# Patient Record
Sex: FEMALE | Race: WHITE | Employment: FULL TIME | URBAN - METROPOLITAN AREA
[De-identification: names, ages, dates, MRNs, and addresses within clinical notes are randomized per-mention and may not be internally consistent; named-entity substitution may affect disease eponyms.]

---

## 2018-01-25 ENCOUNTER — HOSPITAL ENCOUNTER (OUTPATIENT)
Dept: MAMMOGRAPHY | Age: 58
Discharge: HOME | End: 2018-01-25
Payer: COMMERCIAL

## 2018-01-25 DIAGNOSIS — Z12.39 SCREENING BREAST EXAMINATION: ICD-10-CM

## 2018-01-25 PROCEDURE — 77067 SCR MAMMO BI INCL CAD: CPT

## 2019-04-09 ENCOUNTER — OFFICE VISIT (OUTPATIENT)
Dept: FAMILY MEDICINE CLINIC | Age: 59
End: 2019-04-09

## 2019-04-09 VITALS
BODY MASS INDEX: 39.34 KG/M2 | RESPIRATION RATE: 18 BRPM | HEIGHT: 64 IN | HEART RATE: 83 BPM | OXYGEN SATURATION: 96 % | DIASTOLIC BLOOD PRESSURE: 87 MMHG | SYSTOLIC BLOOD PRESSURE: 132 MMHG | WEIGHT: 230.4 LBS | TEMPERATURE: 97.8 F

## 2019-04-09 DIAGNOSIS — M19.90 ARTHRITIS: ICD-10-CM

## 2019-04-09 DIAGNOSIS — I10 ESSENTIAL HYPERTENSION: Primary | ICD-10-CM

## 2019-04-09 RX ORDER — LISINOPRIL 10 MG/1
10 TABLET ORAL DAILY
Qty: 90 TAB | Refills: 1 | Status: SHIPPED | OUTPATIENT
Start: 2019-04-09

## 2019-04-09 RX ORDER — HYDROCHLOROTHIAZIDE 25 MG/1
25 TABLET ORAL DAILY
Qty: 90 TAB | Refills: 1 | Status: SHIPPED | OUTPATIENT
Start: 2019-04-09

## 2019-04-09 RX ORDER — CELECOXIB 200 MG/1
200 CAPSULE ORAL DAILY
Qty: 90 CAP | Refills: 1 | Status: SHIPPED | OUTPATIENT
Start: 2019-04-09 | End: 2019-07-08

## 2019-04-09 NOTE — PROGRESS NOTES
Subjective:     Winifred Rucker is a 63 y.o. female who presents for follow up of hypertension. Diet and Lifestyle: generally follows a low fat low cholesterol diet  Home BP Monitoring: is not measured at home    Cardiovascular ROS: taking medications as instructed, no medication side effects noted, no TIA's, no chest pain on exertion, no dyspnea on exertion, no swelling of ankles. New concerns: Pt is here today as a new patient, with needing refills of her medications. She is on BP medications, as well as Celebrex for her arthritis in her knees. Pt is not fasting today, but will get baseline labs. Patient Active Problem List    Diagnosis Date Noted    Leiomyoma of uterus, unspecified     Excessive or frequent menstruation 10/20/2011     Current Outpatient Medications   Medication Sig Dispense Refill    cholecalciferol, vitD3,/vit K2 (VITAMIN D3-VITAMIN K2 PO) Take  by mouth.  celecoxib (CELEBREX) 200 mg capsule Take 1 Cap by mouth daily for 90 days. 90 Cap 1    lisinopril (PRINIVIL, ZESTRIL) 10 mg tablet Take 1 Tab by mouth daily. Indications: high blood pressure 90 Tab 1    hydroCHLOROthiazide (HYDRODIURIL) 25 mg tablet Take 1 Tab by mouth daily. 90 Tab 1    celecoxib (CELEBREX) 200 mg capsule Take 100 mg by mouth two (2) times a day.  ACETAMINOPHEN (TYLENOL PO) Take 1,300 mg by mouth three (3) times daily.  TRAMADOL HCL (TRAMADOL PO) Take  by mouth.  meperidine (DEMEROL) 50 mg tablet 1-2 tabs PO Q 3-6 hrs PRN 60 Tab 0    promethazine (PHENERGAN) 25 mg tablet 1/2 to 1 tab PO Q 4-6 hrs PRN 30 Tab 0    montelukast (SINGULAIR) 10 mg tablet Take 10 mg by mouth daily.  multivitamin, stress formula (STRESS TAB) tablet Take 1 Tab by mouth daily.  omeprazole (PRILOSEC) 10 mg capsule Take 20 mg by mouth daily.        Allergies   Allergen Reactions    Dexilant [Dexlansoprazole] Unknown (comments)    Prevacid [Lansoprazole] Unknown (comments)     Past Medical History: Diagnosis Date    Arthritis     Duodenal ulcer     Excessive or frequent menstruation 10/20/2011    Hx of colonoscopy 2011    WNL    Hypertension     borderline HTN    Kidney stone     Kidney stones     Leiomyoma of uterus, unspecified     Menstrual migraine, without mention of intractable migraine without mention of status migrainosus      Past Surgical History:   Procedure Laterality Date    HC LITHOTRIPSY      HX ARTHROPLASTY  12/11/2013    Right Hand (joint)    HX ARTHROPLASTY  10/2014    left cmc tendon arthroplsy    HX OTHER SURGICAL  10/2014    left thumb 1st MPJ capsulorophy     Family History   Problem Relation Age of Onset    Hypertension Mother     Arthritis-osteo Mother     Heart Disease Father     Other Father         Lymphoma    Cancer Father         Bladder Neoplasm,Malignant/Prostate    Breast Cancer Maternal Aunt     Other Sister         Fibroids/Leiomyoma of Uterus    Depression Sister     Bipolar Disorder Sister     Other Sister         Osteopenia (younger sister states she is 1 point away from osteoporosis)     Social History     Tobacco Use    Smoking status: Never Smoker    Smokeless tobacco: Never Used   Substance Use Topics    Alcohol use: Yes     Comment: 2 glasses of wine a week.         Lab Results   Component Value Date/Time    WBC 9.8 10/11/2011 01:53 AM    HGB 12.5 10/11/2011 01:53 AM    HCT 36.5 10/11/2011 01:53 AM    PLATELET 317 73/11/3101 01:53 AM    MCV 84.5 10/11/2011 01:53 AM     No results found for: CHOL, CHOLPOCT, HDL, LDL, LDLC, LDLCPOC, LDLCEXT, TRIGL, TGLPOCT, CHHD, CHHDX  Lab Results   Component Value Date/Time    GFR est non-AA >60 12/11/2009 07:58 AM    GFR est AA >60 12/11/2009 07:58 AM    Creatinine 0.7 12/11/2009 07:58 AM    BUN 12 12/11/2009 07:58 AM    Sodium 140 12/11/2009 07:58 AM    Potassium 4.1 12/11/2009 07:58 AM    Chloride 103 12/11/2009 07:58 AM    CO2 31 12/11/2009 07:58 AM        Review of Systems, additional:  Pertinent items are noted in HPI. Objective:     Visit Vitals  /87 (BP 1 Location: Right arm, BP Patient Position: Sitting)   Pulse 83   Temp 97.8 °F (36.6 °C) (Oral)   Resp 18   Ht 5' 4\" (1.626 m)   Wt 230 lb 6.4 oz (104.5 kg)   SpO2 96%   BMI 39.55 kg/m²     Appearance: alert, well appearing, and in no distress. General exam: CVS exam BP noted to be well controlled today in office, S1, S2 normal, no gallop, no murmur, chest clear, no JVD, no HSM, no edema. Lab review: orders written for new lab studies as appropriate; see orders. Assessment/Plan:     hypertension stable. current treatment plan is effective, no change in therapy. ICD-10-CM ICD-9-CM    1. Essential hypertension I10 401.9 lisinopril (PRINIVIL, ZESTRIL) 10 mg tablet      hydroCHLOROthiazide (HYDRODIURIL) 25 mg tablet      CBC WITH AUTOMATED DIFF      METABOLIC PANEL, COMPREHENSIVE   2. Arthritis M19.90 716.90 celecoxib (CELEBREX) 200 mg capsule     Educated about taking medications as prescribed. Educated that we will notify her when labs return, and inform her of any change in plan of care at that time. Pt informed to return to office with worsening of symptoms, or PRN with any questions or concerns. Pt verbalizes understanding of plan of care and denies further questions or concerns at this time.

## 2019-04-09 NOTE — PROGRESS NOTES
Dexter Rosita is a 63 y.o. female      Patient states she does not have any medication allergies. Patient states she avoids aspirin due to ear ringing   No chief complaint on file. 1. Have you been to the ER, urgent care clinic since your last visit? Hospitalized since your last visit? No  M  2. Have you seen or consulted any other health care providers outside of the [address] [place] [address] since your last visit? Include any pap smears or colon screening. No      Visit Vitals  Ht 5' 4\" (1.626 m)   Wt 230 lb 6.4 oz (104.5 kg)   BMI 39.55 kg/m²           Health Maintenance Due   Topic Date Due    Hepatitis C Screening  1960    DTaP/Tdap/Td series (1 - Tdap) 01/31/1982    Shingrix Vaccine Age 50> (1 of 2) 01/31/2011    PAP AKA CERVICAL CYTOLOGY  01/13/2017         Medication Reconciliation completed, changes noted.   Please  Update medication list.

## 2019-04-09 NOTE — PATIENT INSTRUCTIONS
High Blood Pressure: Care Instructions  Overview    It's normal for blood pressure to go up and down throughout the day. But if it stays up, you have high blood pressure. Another name for high blood pressure is hypertension. Despite what a lot of people think, high blood pressure usually doesn't cause headaches or make you feel dizzy or lightheaded. It usually has no symptoms. But it does increase your risk of stroke, heart attack, and other problems. You and your doctor will talk about your risks of these problems based on your blood pressure. Your doctor will give you a goal for your blood pressure. Your goal will be based on your health and your age. Lifestyle changes, such as eating healthy and being active, are always important to help lower blood pressure. You might also take medicine to reach your blood pressure goal.  Follow-up care is a key part of your treatment and safety. Be sure to make and go to all appointments, and call your doctor if you are having problems. It's also a good idea to know your test results and keep a list of the medicines you take. How can you care for yourself at home? Medical treatment  · If you stop taking your medicine, your blood pressure will go back up. You may take one or more types of medicine to lower your blood pressure. Be safe with medicines. Take your medicine exactly as prescribed. Call your doctor if you think you are having a problem with your medicine. · Talk to your doctor before you start taking aspirin every day. Aspirin can help certain people lower their risk of a heart attack or stroke. But taking aspirin isn't right for everyone, because it can cause serious bleeding. · See your doctor regularly. You may need to see the doctor more often at first or until your blood pressure comes down. · If you are taking blood pressure medicine, talk to your doctor before you take decongestants or anti-inflammatory medicine, such as ibuprofen.  Some of these medicines can raise blood pressure. · Learn how to check your blood pressure at home. Lifestyle changes  · Stay at a healthy weight. This is especially important if you put on weight around the waist. Losing even 10 pounds can help you lower your blood pressure. · If your doctor recommends it, get more exercise. Walking is a good choice. Bit by bit, increase the amount you walk every day. Try for at least 30 minutes on most days of the week. You also may want to swim, bike, or do other activities. · Avoid or limit alcohol. Talk to your doctor about whether you can drink any alcohol. · Try to limit how much sodium you eat to less than 2,300 milligrams (mg) a day. Your doctor may ask you to try to eat less than 1,500 mg a day. · Eat plenty of fruits (such as bananas and oranges), vegetables, legumes, whole grains, and low-fat dairy products. · Lower the amount of saturated fat in your diet. Saturated fat is found in animal products such as milk, cheese, and meat. Limiting these foods may help you lose weight and also lower your risk for heart disease. · Do not smoke. Smoking increases your risk for heart attack and stroke. If you need help quitting, talk to your doctor about stop-smoking programs and medicines. These can increase your chances of quitting for good. When should you call for help? Call 911 anytime you think you may need emergency care. This may mean having symptoms that suggest that your blood pressure is causing a serious heart or blood vessel problem. Your blood pressure may be over 180/120.   For example, call 911 if:    · You have symptoms of a heart attack. These may include:  ? Chest pain or pressure, or a strange feeling in the chest.  ? Sweating. ? Shortness of breath. ? Nausea or vomiting. ? Pain, pressure, or a strange feeling in the back, neck, jaw, or upper belly or in one or both shoulders or arms. ? Lightheadedness or sudden weakness.   ? A fast or irregular heartbeat.     · You have symptoms of a stroke. These may include:  ? Sudden numbness, tingling, weakness, or loss of movement in your face, arm, or leg, especially on only one side of your body. ? Sudden vision changes. ? Sudden trouble speaking. ? Sudden confusion or trouble understanding simple statements. ? Sudden problems with walking or balance. ? A sudden, severe headache that is different from past headaches.     · You have severe back or belly pain.    Do not wait until your blood pressure comes down on its own. Get help right away.   Call your doctor now or seek immediate care if:    · Your blood pressure is much higher than normal (such as 180/120 or higher), but you don't have symptoms.     · You think high blood pressure is causing symptoms, such as:  ? Severe headache.  ? Blurry vision.    Watch closely for changes in your health, and be sure to contact your doctor if:    · Your blood pressure measures higher than your doctor recommends at least 2 times. That means the top number is higher or the bottom number is higher, or both.     · You think you may be having side effects from your blood pressure medicine. Where can you learn more? Go to http://brayden-rosi.info/. Enter Z567 in the search box to learn more about \"High Blood Pressure: Care Instructions. \"  Current as of: July 22, 2018  Content Version: 11.9  © 3540-2240 [employer]. Care instructions adapted under license by [employer] (which disclaims liability or warranty for this information). If you have questions about a medical condition or this instruction, always ask your healthcare professional. [place] [address] any warranty or liability for your use of this information.

## 2019-04-10 LAB
ALBUMIN SERPL-MCNC: 4.6 G/DL (ref 3.5–5.5)
ALBUMIN/GLOB SERPL: 1.6 {RATIO} (ref 1.2–2.2)
ALP SERPL-CCNC: 82 IU/L (ref 39–117)
ALT SERPL-CCNC: 60 IU/L (ref 0–32)
AST SERPL-CCNC: 32 IU/L (ref 0–40)
BASOPHILS # BLD AUTO: 0 X10E3/UL (ref 0–0.2)
BASOPHILS NFR BLD AUTO: 1 %
BILIRUB SERPL-MCNC: 0.3 MG/DL (ref 0–1.2)
BUN SERPL-MCNC: 16 MG/DL (ref 6–24)
BUN/CREAT SERPL: 26 (ref 9–23)
CALCIUM SERPL-MCNC: 9.5 MG/DL (ref 8.7–10.2)
CHLORIDE SERPL-SCNC: 102 MMOL/L (ref 96–106)
CO2 SERPL-SCNC: 24 MMOL/L (ref 20–29)
CREAT SERPL-MCNC: 0.61 MG/DL (ref 0.57–1)
EOSINOPHIL # BLD AUTO: 0.2 X10E3/UL (ref 0–0.4)
EOSINOPHIL NFR BLD AUTO: 3 %
ERYTHROCYTE [DISTWIDTH] IN BLOOD BY AUTOMATED COUNT: 14.6 % (ref 12.3–15.4)
GLOBULIN SER CALC-MCNC: 2.9 G/DL (ref 1.5–4.5)
GLUCOSE SERPL-MCNC: 91 MG/DL (ref 65–99)
HCT VFR BLD AUTO: 39.8 % (ref 34–46.6)
HGB BLD-MCNC: 12.9 G/DL (ref 11.1–15.9)
IMM GRANULOCYTES # BLD AUTO: 0 X10E3/UL (ref 0–0.1)
IMM GRANULOCYTES NFR BLD AUTO: 0 %
LYMPHOCYTES # BLD AUTO: 2.5 X10E3/UL (ref 0.7–3.1)
LYMPHOCYTES NFR BLD AUTO: 42 %
MCH RBC QN AUTO: 28.7 PG (ref 26.6–33)
MCHC RBC AUTO-ENTMCNC: 32.4 G/DL (ref 31.5–35.7)
MCV RBC AUTO: 88 FL (ref 79–97)
MONOCYTES # BLD AUTO: 0.5 X10E3/UL (ref 0.1–0.9)
MONOCYTES NFR BLD AUTO: 8 %
NEUTROPHILS # BLD AUTO: 2.8 X10E3/UL (ref 1.4–7)
NEUTROPHILS NFR BLD AUTO: 46 %
PLATELET # BLD AUTO: 281 X10E3/UL (ref 150–379)
POTASSIUM SERPL-SCNC: 4.3 MMOL/L (ref 3.5–5.2)
PROT SERPL-MCNC: 7.5 G/DL (ref 6–8.5)
RBC # BLD AUTO: 4.5 X10E6/UL (ref 3.77–5.28)
SODIUM SERPL-SCNC: 142 MMOL/L (ref 134–144)
WBC # BLD AUTO: 6 X10E3/UL (ref 3.4–10.8)

## 2019-04-10 NOTE — PROGRESS NOTES
Please call patient and let her know that labs returned. Stable. ALT is elevated, will monitor at next check. Should decrease tylenol intake and/or alcohol.   Should return in 6 months for office visit and fasting labs  Thanks

## 2019-12-03 ENCOUNTER — OFFICE VISIT (OUTPATIENT)
Dept: FAMILY MEDICINE CLINIC | Age: 59
End: 2019-12-03

## 2019-12-03 VITALS
OXYGEN SATURATION: 98 % | DIASTOLIC BLOOD PRESSURE: 89 MMHG | RESPIRATION RATE: 16 BRPM | HEART RATE: 94 BPM | TEMPERATURE: 98.1 F | SYSTOLIC BLOOD PRESSURE: 136 MMHG | HEIGHT: 64 IN | WEIGHT: 227 LBS | BODY MASS INDEX: 38.76 KG/M2

## 2019-12-03 DIAGNOSIS — Z13.29 SCREENING FOR THYROID DISORDER: ICD-10-CM

## 2019-12-03 DIAGNOSIS — Z23 NEED FOR SHINGLES VACCINE: ICD-10-CM

## 2019-12-03 DIAGNOSIS — Z00.00 PHYSICAL EXAM: Primary | ICD-10-CM

## 2019-12-03 DIAGNOSIS — E66.01 SEVERE OBESITY (HCC): ICD-10-CM

## 2019-12-03 DIAGNOSIS — Z11.59 ENCOUNTER FOR HEPATITIS C SCREENING TEST FOR LOW RISK PATIENT: ICD-10-CM

## 2019-12-03 DIAGNOSIS — Z00.00 PHYSICAL EXAM: ICD-10-CM

## 2019-12-03 DIAGNOSIS — M19.90 ARTHRITIS: ICD-10-CM

## 2019-12-03 DIAGNOSIS — Z13.220 SCREENING FOR CHOLESTEROL LEVEL: ICD-10-CM

## 2019-12-03 RX ORDER — CHOLECALCIFEROL (VITAMIN D3) 125 MCG
CAPSULE ORAL
COMMUNITY

## 2019-12-03 RX ORDER — ACETAMINOPHEN 325 MG/1
650 TABLET ORAL
COMMUNITY

## 2019-12-03 RX ORDER — CELECOXIB 200 MG/1
200 CAPSULE ORAL
Qty: 30 CAP | Refills: 2 | Status: SHIPPED | OUTPATIENT
Start: 2019-12-03

## 2019-12-03 RX ORDER — CELECOXIB 100 MG/1
100 CAPSULE ORAL
Qty: 30 CAP | Refills: 2 | Status: SHIPPED | OUTPATIENT
Start: 2019-12-03

## 2019-12-03 NOTE — PATIENT INSTRUCTIONS
Celecoxib (By mouth) Celecoxib ([id number]) Treats pain. This medicine is an NSAID. Brand Name(s): CeleBREX, SmartRx CapXib Kit There may be other brand names for this medicine. When This Medicine Should Not Be Used: This medicine is not right for everyone. Do not use it if you had an allergic reaction (including asthma) to celecoxib, aspirin, NSAIDs, or a sulfa drug (such as sulfamethoxazole). Do not use this medicine right before or right after coronary artery bypass graft (CABG). How to Use This Medicine:  
Capsule · Your doctor will tell you how much medicine to use. Do not use more than directed. · It is best to take this medicine with food or milk so it does not upset your stomach. · Use this medicine for the shortest time possible and in the smallest dose possible. This will help lower the risk of side effects. · If you cannot swallow the capsule, you may open it and pour the medicine into a teaspoon of applesauce. Stir the mixture well and swallow right away. Drink enough water to make sure you swallow all of the medicine. · This medicine should come with a Medication Guide. Ask your pharmacist for a copy if you do not have one. · Missed dose: Take a dose as soon as you remember. If it is almost time for your next dose, wait until then and take a regular dose. Do not take extra medicine to make up for a missed dose. · Store the medicine in a closed container at room temperature, away from heat, moisture, and direct light. Any medicine that has been mixed with applesauce may be stored in a refrigerator and used within 6 hours. Drugs and Foods to Avoid: Ask your doctor or pharmacist before using any other medicine, including over-the-counter medicines, vitamins, and herbal products. · Some medicines and foods can affect how celecoxib works. Tell your doctor if you are taking any of the following: ¨ A blood thinner, such as warfarin ¨ A diuretic (water pill), such as furosemide, hydrochlorothiazide (HCTZ), torsemide ¨ Blood pressure medicine, such as enalapril, lisinopril, losartan, olmesartan, valsartan ¨ Fluconazole ¨ Lithium ¨ Other pain or arthritis medicine, such as aspirin, diclofenac, ibuprofen, naproxen ¨ Steroid medicine, such as hydrocortisone, methylprednisolone, prednisone · Do not drink alcohol while you are using this medicine. Warnings While Using This Medicine: · Tell your doctor if you are pregnant or breastfeeding. Do not use this medicine during the later part of pregnancy, unless your doctor tells you to. · Tell your doctor if you have a history of ulcers or other stomach problems, kidney or liver disease, anemia, aspirin-sensitive asthma, high blood pressure, congestive heart failure, or other heart or circulation problems. · This medicine may cause the following problems: ¨ A serious liver problem ¨ Bleeding in your stomach or intestines ¨ Increased risk for a heart attack or stroke ¨ Risk for disseminated intravascular coagulation (bleeding problem) in children younger than 18 years · Tell any doctor or dentist who treats you that you are using this medicine. · Your doctor will do lab tests at regular visits to check on the effects of this medicine. Keep all appointments. · Keep all medicine out of the reach of children. Never share your medicine with anyone. Possible Side Effects While Using This Medicine:  
Call your doctor right away if you notice any of these side effects: · Allergic reaction: Itching or hives, swelling in your face or hands, swelling or tingling in your mouth or throat, chest tightness, trouble breathing · Blistering, peeling, red skin rash · Bloody or black, tarry stools · Change in how much or how often you urinate, bloody or cloudy urine · Chest pain, shortness of breath, or coughing up blood · Fast or slow heartbeat · Dark urine or pale stools, nausea, vomiting, loss of appetite, stomach pain, yellow skin or eyes · Numbness or weakness in your arm or leg, or on one side of your body · Pain in your calf · Shortness of breath, cold sweat, and bluish skin · Sudden or severe headache, dizziness, or problems with vision, speech, or walking · Swelling in your hands, ankles, or feet, rapid weight gain · Unusual tiredness or weakness, pale skin · Vomiting blood or material that looks like coffee grounds If you notice these less serious side effects, talk with your doctor: · Mild skin rash · Muscle or joint pain If you notice other side effects that you think are caused by this medicine, tell your doctor. Call your doctor for medical advice about side effects. You may report side effects to FDA at 7-785-FDA-6217 © 2017 [address] Information is for End User's use only and may not be sold, redistributed or otherwise used for commercial purposes. The above information is an  only. It is not intended as medical advice for individual conditions or treatments. Talk to your doctor, nurse or pharmacist before following any medical regimen to see if it is safe and effective for you.

## 2019-12-03 NOTE — PROGRESS NOTES
Subjective:   63 y.o. female for Well Woman Check. Her gyne and breast care is done elsewhere by her Ob-Gyne physician. Patient Active Problem List    Diagnosis Date Noted    Severe obesity ([address] 12/03/2019    Leiomyoma of uterus, unspecified     Excessive or frequent menstruation 10/20/2011     Current Outpatient Medications   Medication Sig Dispense Refill    acetaminophen (TYLENOL) 325 mg tablet Take 650 mg by mouth every four (4) hours as needed for Pain.  naproxen sodium (ALEVE) 220 mg cap Take  by mouth.  celecoxib (CELEBREX) 200 mg capsule Take 1 Cap by mouth daily (with breakfast). 30 Cap 2    celecoxib (CELEBREX) 100 mg capsule Take 1 Cap by mouth daily (with dinner). 30 Cap 2    varicella-zoster recombinant, PF, (SHINGRIX, PF,) 50 mcg/0.5 mL susr injection 0.5 mL by IntraMUSCular route once for 1 dose. 0.5 mL 0    cholecalciferol, vitD3,/vit K2 (VITAMIN D3-VITAMIN K2 PO) Take 1 Tab by mouth BID Mon Wed & Fri.      lisinopril (PRINIVIL, ZESTRIL) 10 mg tablet Take 1 Tab by mouth daily. Indications: high blood pressure (Patient taking differently: Take 10 mg by mouth daily. TAKES ONLY WHEN TAKING NSAIDS  Indications: high blood pressure) 90 Tab 1    hydroCHLOROthiazide (HYDRODIURIL) 25 mg tablet Take 1 Tab by mouth daily. (Patient taking differently: Take 25 mg by mouth daily as needed.) 90 Tab 1    celecoxib (CELEBREX) 200 mg capsule Take 200 mg by mouth daily.        Allergies   Allergen Reactions    Aspirin Other (comments)     MAKES TINNITUS WORSE    Dexilant [Dexlansoprazole] Unknown (comments)    Prevacid [Lansoprazole] Unknown (comments)     Past Medical History:   Diagnosis Date    Arthritis     Duodenal ulcer     Excessive or frequent menstruation 10/20/2011    Hx of colonoscopy 2011    WNL    Hypertension     borderline HTN    Kidney stone     Kidney stones     Leiomyoma of uterus, unspecified     Menstrual migraine, without mention of intractable migraine without mention of status migrainosus      Past Surgical History:   Procedure Laterality Date    HC LITHOTRIPSY      HX ARTHROPLASTY  12/11/2013    Right Hand (joint)    HX ARTHROPLASTY  10/2014    left cmc tendon arthroplsy    HX OTHER SURGICAL  10/2014    left thumb 1st MPJ capsulorophy     Family History   Problem Relation Age of Onset    Hypertension Mother     Arthritis-osteo Mother     Heart Disease Father     Other Father         Lymphoma    Cancer Father         Bladder Neoplasm,Malignant/Prostate    Breast Cancer Maternal Aunt     Other Sister         Fibroids/Leiomyoma of Uterus    Depression Sister     Bipolar Disorder Sister     Other Sister         Osteopenia (younger sister states she is 1 point away from osteoporosis)     Social History     Tobacco Use    Smoking status: Never Smoker    Smokeless tobacco: Never Used   Substance Use Topics    Alcohol use: Yes     Comment: 2 glasses of wine a week. Lab Results   Component Value Date/Time    WBC 6.0 04/09/2019 01:27 PM    HGB 12.9 04/09/2019 01:27 PM    HCT 39.8 04/09/2019 01:27 PM    PLATELET 975 41/15/1272 01:27 PM    MCV 88 04/09/2019 01:27 PM     No results found for: CHOL, CHOLPOCT, HDL, LDL, LDLC, LDLCPOC, LDLCEXT, TRIGL, TGLPOCT, CHHD, CHHDX  Lab Results   Component Value Date/Time    GFR est non- 04/09/2019 01:27 PM    GFR est  04/09/2019 01:27 PM    Creatinine 0.61 04/09/2019 01:27 PM    BUN 16 04/09/2019 01:27 PM    Sodium 142 04/09/2019 01:27 PM    Potassium 4.3 04/09/2019 01:27 PM    Chloride 102 04/09/2019 01:27 PM    CO2 24 04/09/2019 01:27 PM        ROS: Feeling generally well. No TIA's or unusual headaches, no dysphagia. No prolonged cough. No dyspnea or chest pain on exertion. No abdominal pain, change in bowel habits, black or bloody stools. No urinary tract symptoms. No new or unusual musculoskeletal symptoms. Specific concerns today: Pt has no concerns today, besides needing her Celebrex refilled. She believes that she could use a boost in the does, as she is having to take a lot of Tylenol in addition to the Celebrex. Objective: The patient appears well, alert, oriented x 3, in no distress. Visit Vitals  /89 (BP 1 Location: Left arm, BP Patient Position: Sitting)   Pulse 94   Temp 98.1 °F (36.7 °C) (Oral)   Resp 16   Ht 5' 4\" (1.626 m)   Wt 227 lb (103 kg)   SpO2 98%   BMI 38.96 kg/m²     ENT normal.  Neck supple. No adenopathy or thyromegaly. SANDY. Lungs are clear, good air entry, no wheezes, rhonchi or rales. S1 and S2 normal, no murmurs, regular rate and rhythm. Abdomen soft without tenderness, guarding, mass or organomegaly. Extremities show no edema, normal peripheral pulses. Neurological is normal, no focal findings. Breast and Pelvic exams are deferred. Assessment/Plan:   Well Woman  lose weight, increase physical activity    ICD-10-CM ICD-9-CM    1. Physical exam Z00.00 V70.9 CBC W/O DIFF      METABOLIC PANEL, COMPREHENSIVE      LIPID PANEL      THYROID CASCADE PROFILE      CBC W/O DIFF      METABOLIC PANEL, COMPREHENSIVE      LIPID PANEL      THYROID CASCADE PROFILE      HEPATITIS C AB   2. Severe obesity ([address] E66.01 278.01    3. Arthritis M19.90 716.90 celecoxib (CELEBREX) 200 mg capsule      celecoxib (CELEBREX) 100 mg capsule   4. Screening for cholesterol level Z13.220 V77.91 LIPID PANEL   5. Screening for thyroid disorder Z13.29 V77.0 THYROID CASCADE PROFILE   6. Encounter for hepatitis C screening test for low risk patient Z11.59 V73.89 HEPATITIS C AB   7. Need for shingles vaccine Z23 V04.89 varicella-zoster recombinant, PF, (SHINGRIX, PF,) 50 mcg/0.5 mL susr injection     Will notify when labs return, and inform her of any change in plan of care at that time    Pt informed to return to office with worsening of symptoms, or PRN with any questions or concerns. Pt verbalizes understanding of plan of care and denies further questions or concerns at this time.

## 2019-12-03 NOTE — PROGRESS NOTES
Identified pt with two pt identifiers(name and ). Chief Complaint   Patient presents with    Complete Physical    Labs     NPO since midnight    Back Pain     chronic pain - needs celebrex refilled    Knee Pain     chronic pain - needs celebrex refilled    Medication Refill     chronic pain - needs celebrex refilled        Health Maintenance Due   Topic    Hepatitis C Screening     DTaP/Tdap/Td series (1 - Tdap)    Shingrix Vaccine Age 50> (1 of 2)    PAP AKA CERVICAL CYTOLOGY     Influenza Age 5 to Adult     BREAST CANCER SCRN MAMMOGRAM        Wt Readings from Last 3 Encounters:   19 227 lb (103 kg)   19 230 lb 6.4 oz (104.5 kg)   14 227 lb (103 kg)     Temp Readings from Last 3 Encounters:   19 98.1 °F (36.7 °C) (Oral)   19 97.8 °F (36.6 °C) (Oral)   13 97.8 °F (36.6 °C)     BP Readings from Last 3 Encounters:   19 136/89   19 132/87   14 126/72     Pulse Readings from Last 3 Encounters:   19 94   19 83   13 81         Learning Assessment:  :     Learning Assessment 12/3/2019   PRIMARY LEARNER Patient   HIGHEST LEVEL OF EDUCATION - PRIMARY LEARNER  4 YEARS OF COLLEGE   BARRIERS PRIMARY LEARNER NONE   CO-LEARNER CAREGIVER No   PRIMARY LANGUAGE ENGLISH   LEARNER PREFERENCE PRIMARY LISTENING     DEMONSTRATION   ANSWERED BY patient   RELATIONSHIP SELF       Depression Screening:  :     3 most recent PHQ Screens 12/3/2019   Little interest or pleasure in doing things Not at all   Feeling down, depressed, irritable, or hopeless Not at all   Total Score PHQ 2 0       Fall Risk Assessment:  :     No flowsheet data found. Abuse Screening:  :     Abuse Screening Questionnaire 12/3/2019   Do you ever feel afraid of your partner? N   Are you in a relationship with someone who physically or mentally threatens you? N   Is it safe for you to go home?  Y       Coordination of Care Questionnaire:  :     1) Have you been to an emergency room, urgent care clinic since your last visit? no   Hospitalized since your last visit? no             2) Have you seen or consulted any other health care providers outside of [address] [place] [address] since your last visit? no  (Include any pap smears or colon screenings in this section.)    3) Do you have an Advance Directive on file? no  Are you interested in receiving information about Advance Directives? no    Reviewed record in preparation for visit and have obtained necessary documentation. Medication reconciliation up to date and corrected with patient at this time.

## 2019-12-04 LAB
ALBUMIN SERPL-MCNC: 4.5 G/DL (ref 3.5–5.5)
ALBUMIN/GLOB SERPL: 1.6 {RATIO} (ref 1.2–2.2)
ALP SERPL-CCNC: 62 IU/L (ref 39–117)
ALT SERPL-CCNC: 20 IU/L (ref 0–32)
AST SERPL-CCNC: 16 IU/L (ref 0–40)
BILIRUB SERPL-MCNC: 0.4 MG/DL (ref 0–1.2)
BUN SERPL-MCNC: 15 MG/DL (ref 6–24)
BUN/CREAT SERPL: 28 (ref 9–23)
CALCIUM SERPL-MCNC: 9.8 MG/DL (ref 8.7–10.2)
CHLORIDE SERPL-SCNC: 104 MMOL/L (ref 96–106)
CHOLEST SERPL-MCNC: 234 MG/DL (ref 100–199)
CO2 SERPL-SCNC: 24 MMOL/L (ref 20–29)
CREAT SERPL-MCNC: 0.53 MG/DL (ref 0.57–1)
ERYTHROCYTE [DISTWIDTH] IN BLOOD BY AUTOMATED COUNT: 13.1 % (ref 12.3–15.4)
GLOBULIN SER CALC-MCNC: 2.8 G/DL (ref 1.5–4.5)
GLUCOSE SERPL-MCNC: 89 MG/DL (ref 65–99)
HCT VFR BLD AUTO: 42.7 % (ref 34–46.6)
HCV AB S/CO SERPL IA: <0.1 S/CO RATIO (ref 0–0.9)
HDLC SERPL-MCNC: 53 MG/DL
HGB BLD-MCNC: 14.3 G/DL (ref 11.1–15.9)
INTERPRETATION, 910389: NORMAL
LDLC SERPL CALC-MCNC: 153 MG/DL (ref 0–99)
MCH RBC QN AUTO: 28.9 PG (ref 26.6–33)
MCHC RBC AUTO-ENTMCNC: 33.5 G/DL (ref 31.5–35.7)
MCV RBC AUTO: 86 FL (ref 79–97)
PLATELET # BLD AUTO: 328 X10E3/UL (ref 150–450)
POTASSIUM SERPL-SCNC: 4.3 MMOL/L (ref 3.5–5.2)
PROT SERPL-MCNC: 7.3 G/DL (ref 6–8.5)
RBC # BLD AUTO: 4.95 X10E6/UL (ref 3.77–5.28)
SODIUM SERPL-SCNC: 143 MMOL/L (ref 134–144)
TRIGL SERPL-MCNC: 139 MG/DL (ref 0–149)
TSH SERPL DL<=0.005 MIU/L-ACNC: 1.79 UIU/ML (ref 0.45–4.5)
VLDLC SERPL CALC-MCNC: 28 MG/DL (ref 5–40)
WBC # BLD AUTO: 5.8 X10E3/UL (ref 3.4–10.8)

## 2019-12-04 NOTE — PROGRESS NOTES
Please call patient and let her know that her labs returned:  Stable besides elevated cholesterol. LDL is elevated, but so is HDL, which is cardioprotective. Should focus on mediterranean diet.   All others stable  Thanks

## 2020-01-28 ENCOUNTER — TELEPHONE (OUTPATIENT)
Dept: FAMILY MEDICINE CLINIC | Age: 60
End: 2020-01-28

## 2020-01-28 NOTE — TELEPHONE ENCOUNTER
The pt came by today and states the insurance has changed for her as of January 1st she had refills but when she went to pick it up it needed a PA. We no longer take her insurance so i'm not sure if this can be done.

## 2020-01-29 NOTE — TELEPHONE ENCOUNTER
Babak Alfred, she should have pharmacy send PA info here, and will complete once we received  Thanks

## 2021-08-18 ENCOUNTER — TRANSCRIBE ORDER (OUTPATIENT)
Dept: SCHEDULING | Age: 61
End: 2021-08-18

## 2021-08-18 DIAGNOSIS — Z82.49 FAMILY HISTORY OF CORONARY ARTERY DISEASE: ICD-10-CM

## 2021-08-18 DIAGNOSIS — Z13.6 ENCOUNTER FOR SCREENING FOR CORONARY ARTERY DISEASE: Primary | ICD-10-CM
